# Patient Record
Sex: MALE | Race: WHITE | NOT HISPANIC OR LATINO | Employment: FULL TIME | URBAN - METROPOLITAN AREA
[De-identification: names, ages, dates, MRNs, and addresses within clinical notes are randomized per-mention and may not be internally consistent; named-entity substitution may affect disease eponyms.]

---

## 2023-08-29 ENCOUNTER — ANCILLARY PROCEDURE (OUTPATIENT)
Dept: GENERAL RADIOLOGY | Facility: CLINIC | Age: 33
End: 2023-08-29
Attending: PHYSICIAN ASSISTANT
Payer: COMMERCIAL

## 2023-08-29 ENCOUNTER — OFFICE VISIT (OUTPATIENT)
Dept: URGENT CARE | Facility: URGENT CARE | Age: 33
End: 2023-08-29
Payer: COMMERCIAL

## 2023-08-29 VITALS
RESPIRATION RATE: 20 BRPM | SYSTOLIC BLOOD PRESSURE: 123 MMHG | HEART RATE: 78 BPM | OXYGEN SATURATION: 98 % | TEMPERATURE: 98 F | DIASTOLIC BLOOD PRESSURE: 78 MMHG

## 2023-08-29 DIAGNOSIS — M79.645 PAIN OF LEFT THUMB: ICD-10-CM

## 2023-08-29 DIAGNOSIS — S62.512A CLOSED DISPLACED FRACTURE OF PROXIMAL PHALANX OF LEFT THUMB, INITIAL ENCOUNTER: Primary | ICD-10-CM

## 2023-08-29 PROCEDURE — 99203 OFFICE O/P NEW LOW 30 MIN: CPT | Performed by: PHYSICIAN ASSISTANT

## 2023-08-29 PROCEDURE — 73140 X-RAY EXAM OF FINGER(S): CPT | Mod: TC | Performed by: RADIOLOGY

## 2023-08-29 RX ORDER — IBUPROFEN 200 MG
400 TABLET ORAL ONCE
Status: COMPLETED | OUTPATIENT
Start: 2023-08-29 | End: 2023-09-11

## 2023-08-29 NOTE — PROGRESS NOTES
SSESSMENT/PLAN:      (M79.645) Pain of left thumb  Plan: XR Finger Left G/E 2 Views, ibuprofen         (ADVIL/MOTRIN) tablet 400 mg        (S62.285A) Closed displaced fracture of proximal phalanx of left thumb, initial encounter  (primary encounter diagnosis)  MDM: Left thumb proximal phalanx fracture in a left-hand-dominant 32-year-old male related to an injury he sustained while playing in a softball tournament as per above.  No evidence of neurovascular compromise.  I have advised patient follow-up with an orthopedic specialist in the next couple of days.  Patient tells me he is hoping to remain in the [facility] area for the next week, so I provided him with a [place] orthopedic referral.  Patient is also educated he could follow-up at [facility] urgent care or [facility] for recheck.  Patient is educated his thumb spica splint is  only meant for temporary treatment.  I have advised him I do expect they will want to cast him at a follow-up appointment.  I reviewed all above with patient today.  Please also see the below patient discharge summary.    Plan: ibuprofen (ADVIL/MOTRIN) tablet 400 mg,         Orthopedic  Referral, Wrist/Arm         Supplies Order Wrist Brace; Left; with thumb         spica              August 29, 2023 Urgent Care Plan:     1. I provided a thumb spica/wrist brace for you today to immobilize your broken thumb.       2. Ice and elevate    3. You can alternate Ibuprofen 400 mg--with Tylenol 650 mg (switch back and forth every 4 hours) for the next 3-4 days.     4.  Follow-up with an orthopedic doctor in the next 24-48 hours.     5. Follow-up immediately for medical re-evaluation if you develop any increased pain, redness or swelling.  Follow-up immediately if fingers or hand becomes cold, blue, numb, or tingly.     -A  CD copy of x-rays was also provided to patient today (as he will require additional follow-up when he returns home to the [place] area)    (M79.645) Pain of left thumb  Plan: XR Finger Left G/E 2 Views, ibuprofen         (ADVIL/MOTRIN) tablet 400 mg        This progress note has been dictated, with use of voice recognition software. Any grammatical, typographical, or context errors are unintentional and inherent to use of voice recognition software.    -------------------------      Chief Complaint   Patient presents with    Thumb Discomfort     Thumb pain can't move it pt was playing soft ball        SUBJECTIVE:  Flavio Bowens  is a 32 year old , left hand dominant male who presents to urgent care today for evaluation of a cute onset left thumb pain related to an injury he sustained while playing in a softball tournament (patient is here from [facility] playing in an adult [employer] softball tournament) just prior to his presentation here today.    HPI: Exact mechanism of injury is somewhat difficult for patient to describe.   It sounds like his left hand was inside his softball glove patient went to \"tag\" another player--but the players body accidentally bent his thumb forcefully (sounds probably like a forced  adduction mechanism).  Patient states he did not noticed any obvious deformity after the above.  He reportedly did not need to \"relocate\" his thumb.  Patient points to DIP joint as site of maximal pain.    He does have range of motion but notes it is tender in the IP joint area.    Specifically denies any cold, blue, icy feeling fingers.  Denies any numbness or tingling of affected upper extremity.     OBJECTIVE:  /78   Pulse 78   Temp 98  F (36.7  C)   Resp 20   SpO2 98%      GENERAL:  Very pleasant, comfortable and generally well appearing.     LEFT HAND EXAM:  Positive for mild swelling.  No redness.  No bruising.  No lacerations or abrasions.  Positive for pain in left thumb IP joint area.  Patient does demonstrate full range of motion.  He is specifically able to flex and extend thumb across the IP " joint.  Exam of remainder of left fingers, left hand, left wrist and forearm are unremarkable.    SKIN: No laceration, abrasion, hematoma, bruising or swelling     NEURO: Sensation intact along ulnar side, radial side and fingertip of affected digit (thumb).  Sensation along remaining digits was also confirmed normal. Alert and oriented.  Normal speech and mentation.  CN II/XII grossly intact.  Gait within normal limits.      VASCULAR: No compromise to distal circulation. Brachial and radial pulses are 2+ and equal to that of unaffected extremity. Good/brisk capillary refill to all digits confirmed today (including affected digit/thumb)..      XR LEFT THUMB: Positive for proximal phalanx fracture with minimal displacement.  I did review my above impression, along with the actual x-ray images during his urgent care visit today        A

## 2023-08-29 NOTE — PATIENT INSTRUCTIONS
August 29, 2023 Urgent Care Plan:     1. I provided a thumb spica/wrist brace for you today to immobilize your broken thumb.       2. Ice and elevate    3. You can alternate Ibuprofen 400 mg--with Tylenol 650 mg (switch back and forth every 4 hours) for the next 3-4 days.     4.  Follow-up with an orthopedic doctor in the next 24-48 hours.     5. Follow-up immediately for medical re-evaluation if you develop any increased pain, redness or swelling.  Follow-up immediately if fingers or hand becomes cold, blue, numb, or tingly.

## 2023-08-29 NOTE — PROGRESS NOTES
{PROVIDER CHARTING PREFERENCE:595592}    Subjective   Flavio is a 32 year old, presenting for the following health issues:  No chief complaint on file.  {(!) Visit Details have not yet been documented.  Please enter Visit Details and then use this list to pull in documentation. (Optional):646507}    HPI     {SUPERLIST (Optional):601898}  {additonal problems for provider to add (Optional):452807}      Review of Systems   {ROS COMP (Optional):423542}      Objective    There were no vitals taken for this visit.  There is no height or weight on file to calculate BMI.  Physical Exam   {Exam List (Optional):818475}    {Diagnostic Test Results (Optional):807394}    {AMBULATORY ATTESTATION (Optional):408815}

## 2023-08-30 ENCOUNTER — TELEPHONE (OUTPATIENT)
Dept: ORTHOPEDICS | Facility: CLINIC | Age: 33
End: 2023-08-30
Payer: COMMERCIAL

## 2023-08-30 NOTE — TELEPHONE ENCOUNTER
Orthopedic/Sports Medicine Fracture Triage    Incoming call/or message from call center member.    Fracture type: Finger.    The patient is in a  splint.    Date of injury 8/29/23.    Triaged by: Dr. Wood .    Determined to be managed Non operatively or surgically.    Needs to be seen in 1-2 weeks.    Additional Comments/information: Per Dr. Wood, this could be either non-op or surgical.  Patient will not be getting surgery this week here in [place], so could either follow up with our sports med providers or wait until he returns to [place], [place] to follow up with a local orthopedist.    Adi Eric, ATC

## 2023-08-30 NOTE — TELEPHONE ENCOUNTER
[facility] Call Center    Phone Message    May a detailed message be left on voicemail: yes     Reason for Call: Other: RED FLAG FRACTURE TRIAGE NEEDED [facility] called into schedule an appointment due to having a Closed displaced fracture of proximal phalanx of left thumb, initial encounter patient has also had xrays done. Please evaluate and contact patient to schedule. He will be going back to [place] on Sunday       Action Taken: Other: [facility]    Travel Screening: Not Applicable

## 2023-08-30 NOTE — TELEPHONE ENCOUNTER
ATC left VM for patient explaining their best option for follow up treatment is likely locally in [place], [place] as patient will not receive surgery here within the next week and sports medicine has no options for today or tomorrow.  Offered patient [facility] as only local option for follow up.  Provided clinic # for further questions.    Adi Eric, ATC

## 2023-09-11 RX ADMIN — Medication 400 MG: at 13:08
